# Patient Record
Sex: MALE | Race: WHITE
[De-identification: names, ages, dates, MRNs, and addresses within clinical notes are randomized per-mention and may not be internally consistent; named-entity substitution may affect disease eponyms.]

---

## 2023-04-10 ENCOUNTER — HOSPITAL ENCOUNTER (OUTPATIENT)
Dept: HOSPITAL 75 - ENDO | Age: 53
Discharge: HOME | End: 2023-04-10
Attending: SURGERY
Payer: COMMERCIAL

## 2023-04-10 VITALS — BODY MASS INDEX: 32.52 KG/M2 | WEIGHT: 240.08 LBS | HEIGHT: 72.01 IN

## 2023-04-10 VITALS — SYSTOLIC BLOOD PRESSURE: 101 MMHG | DIASTOLIC BLOOD PRESSURE: 65 MMHG

## 2023-04-10 VITALS — DIASTOLIC BLOOD PRESSURE: 58 MMHG | SYSTOLIC BLOOD PRESSURE: 96 MMHG

## 2023-04-10 VITALS — SYSTOLIC BLOOD PRESSURE: 104 MMHG | DIASTOLIC BLOOD PRESSURE: 63 MMHG

## 2023-04-10 VITALS — DIASTOLIC BLOOD PRESSURE: 77 MMHG | SYSTOLIC BLOOD PRESSURE: 128 MMHG

## 2023-04-10 VITALS — DIASTOLIC BLOOD PRESSURE: 63 MMHG | SYSTOLIC BLOOD PRESSURE: 104 MMHG

## 2023-04-10 DIAGNOSIS — K57.30: ICD-10-CM

## 2023-04-10 DIAGNOSIS — Z12.11: Primary | ICD-10-CM

## 2023-04-10 DIAGNOSIS — K63.5: ICD-10-CM

## 2023-04-10 DIAGNOSIS — Z80.0: ICD-10-CM

## 2023-04-10 DIAGNOSIS — D12.2: ICD-10-CM

## 2023-04-10 DIAGNOSIS — E66.9: ICD-10-CM

## 2023-04-10 DIAGNOSIS — K64.8: ICD-10-CM

## 2023-04-10 NOTE — PROGRESS NOTE-POST OPERATIVE
Post-Operative Progess Note


Surgeon (s)/Assistant (s)


Surgeon


ARACELI GONZALEZ DO


Assistant:  [address], DAYLINII





Pre-Operative Diagnosis


screening colonoscopy





Post-Operative Diagnosis





Polyps


diverticula


int hemorrhoids





Procedure & Operative Findings


Date of Procedure


4/10/23


Procedure Performed/Findings


Colonoscopy with snare polypectomy





PROCEDURE NOTE:


After informed consent was obtained, the patient was brought to the endoscopy


suite, placed in bed in left lateral decubitus position.  He  was administered


IV sedation by the CRNA who then monitored his vitals the entire time, heart


rate, blood pressure and pulse ox and the scope was inserted.  On the way in, in


the Ascending colon found a larger polyp; removed with hot snare in two pieces. 

I


then pushed all the way to about 150 cm and pushed into the cecum, took a 

picture 


of the appendiceal orifice and noted the ileocecal valve. Then slowly withdrew 

the 


scope; tried to suction up the polyp, but it was too large to come through the 

scope.


I then had to use a Valdivia net to grab the polyp and insufflated to look 

circumferentially 


at the walls starting in the cecum, up the ascending colon to the hepatic 

flexure, then 


down the transverse colon, to the splenic flexure and into the descending colon.

Saw


another polyp here, but removed the scope entirely to get the earlier polyp out.

 Then


reinserted the scope up to the Descending colon, found the polyp and removed it


with snare polypectomy.  Next, continues down into the sigmoid and then into the


rectal vault and retroflexed the scope. Took picture of the internal 

hemorrhoids.  I had


also noted and taken pictures of diverticula throughout the left side of the 

colon.





The patient tolerated the procedure.  He was recovered in endoscopy suite.





Recommended for repeat colonoscopy in 1 year, because of the large polyp removed


in pieces.


Anesthesia Type


IV sedation by CRNA





Estimated Blood Loss


Estimated blood loss (mL):  scant





Specimens/Packing


Specimens Removed


asc colon polyp


decs colon polyp











ARACELI GONZALEZ DO               Apr 10, 2023 09:13

## 2023-04-10 NOTE — ANESTHESIA-GENERAL POST-OP
MAC


Patient Condition


Mental Status/LOC:  Same as Preop


Cardiovascular:  Satisfactory


Nausea/Vomiting:  Absent


Respiratory:  Satisfactory


Pain:  Controlled


Complications:  Absent





Post Op Complications


Complications


None





Follow Up Care/Instructions


Patient Instructions


None needed.





Anesthesiology Discharge Order


Discharge Order


Patient is doing well, no complaints, stable vital signs, no apparent adverse 

anesthesia problems.   


No complications reported per nursing.











YAMINI HENDRICKSON CRNA              Apr 10, 2023 13:15

## 2023-04-10 NOTE — ENDOSCOPY DISCHARGE INSTRUCT
Endo Procedure/Findings


Findings


1.:  Polyp


2.:  Diverticulosis


3.:  Internal Hemorrhoids





Discharge Instructions


-


Activity: You might feel a little sleepy until tomorrow.  This is due to the 

medicine you received to relax you.





Until tomorrow, you should:  


   NOT drive a car, operate machinery or power tools.


   NOT drink any alcoholic beverages.


   NOT make any important decisions or sign importortant papers.





Do not return to work until tomorrow, unless otherwise instructed. Resume 

previous activities tomorrow.





Diet: Start by taking liquids.  If you tolerate liquids, advance to solid food.


1.:  Colonoscopy in 1 year





Notify Physician


-


If you experience excessive bleeding, unusual abdominal pain, fever, or chest 

pain, contact your doctor immediately.





Follow-Up:


Other Follow up


in my office in one week











ARACELI GONZALEZ DO               Apr 10, 2023 09:14

## 2023-04-10 NOTE — PROGRESS NOTE-PRE OPERATIVE
Pre-Operative Progress Note


Date of Available H&P:  Mar 28, 2023


Date H&P Reviewed:  Apr 10, 2023


Time H&P Reviewed:  08:21


History & Physical:  H&P Reviewed, Patient Examed, No changes noted


Pre-Operative Diagnosis:  screening colonoscopy











ARACELI GONZALEZ DO               Apr 10, 2023 08:29

## 2023-04-12 ENCOUNTER — HOSPITAL ENCOUNTER (EMERGENCY)
Dept: HOSPITAL 75 - ER | Age: 53
Discharge: HOME | End: 2023-04-12
Payer: COMMERCIAL

## 2023-04-12 VITALS — WEIGHT: 229.28 LBS | BODY MASS INDEX: 27.92 KG/M2 | HEIGHT: 75.98 IN

## 2023-04-12 VITALS — SYSTOLIC BLOOD PRESSURE: 155 MMHG | DIASTOLIC BLOOD PRESSURE: 108 MMHG

## 2023-04-12 DIAGNOSIS — Z28.310: ICD-10-CM

## 2023-04-12 DIAGNOSIS — K92.1: Primary | ICD-10-CM

## 2023-04-12 LAB
BASOPHILS # BLD AUTO: 0 10^3/UL (ref 0–0.1)
BASOPHILS NFR BLD AUTO: 0 % (ref 0–10)
EOSINOPHIL # BLD AUTO: 0.2 10^3/UL (ref 0–0.3)
EOSINOPHIL NFR BLD AUTO: 3 % (ref 0–10)
HCT VFR BLD CALC: 40 % (ref 40–54)
HGB BLD-MCNC: 13.7 G/DL (ref 13.3–17.7)
LYMPHOCYTES # BLD AUTO: 1.9 10^3/UL (ref 1–4)
LYMPHOCYTES NFR BLD AUTO: 28 % (ref 12–44)
MANUAL DIFFERENTIAL PERFORMED BLD QL: NO
MCH RBC QN AUTO: 30 PG (ref 25–34)
MCHC RBC AUTO-ENTMCNC: 34 G/DL (ref 32–36)
MCV RBC AUTO: 88 FL (ref 80–99)
MONOCYTES # BLD AUTO: 0.6 10^3/UL (ref 0–1)
MONOCYTES NFR BLD AUTO: 9 % (ref 0–12)
NEUTROPHILS # BLD AUTO: 4 10^3/UL (ref 1.8–7.8)
NEUTROPHILS NFR BLD AUTO: 59 % (ref 42–75)
PLATELET # BLD: 253 10^3/UL (ref 130–400)
PMV BLD AUTO: 9.9 FL (ref 9–12.2)
WBC # BLD AUTO: 6.7 10^3/UL (ref 4.3–11)

## 2023-04-12 PROCEDURE — 99281 EMR DPT VST MAYX REQ PHY/QHP: CPT

## 2023-04-12 PROCEDURE — 85025 COMPLETE CBC W/AUTO DIFF WBC: CPT

## 2023-04-12 PROCEDURE — 36415 COLL VENOUS BLD VENIPUNCTURE: CPT

## 2023-04-12 NOTE — ED GI
General


Chief Complaint:  Rect Problems


Stated Complaint:  RECTAL BLEEDING


Nursing Triage Note:  


PATIENT STATES MONDAY HE HAD A COLONOSCOPY BY [facility], STATES HAD POLYPS REMOVED 


AND DX DIVERTICULITIS. PATIENT STATES STARTING AT 0430 THIS AM HAS HAD DARK RED 


BLOOD STOOLS.


Source of Information:  Patient


Exam Limitations:  No Limitations





History of Present Illness


Date Seen by Provider:  Apr 12, 2023


Time Seen by Provider:  06:16


Initial Comments


52-year-old male presents to the emergency department today for blood in his 

stools.  He had a colonoscopy on 4/10 and 2 polyps were excised.  He was also 

found to have internal hemorrhoids and diverticulosis.  He states he was doing 

relatively well.  He had a bowel movement this morning that he describes as 

mostly dark blood.  He went to work and then had a similar episode.  He came in 

for further evaluation.  He states he called the [place] emergency department 

and was advised to present here.  Denies any fevers or chills.  He is not on any

blood thinning medications.  No dizziness, lightheadedness.  No abdominal pain.





All other systems reviewed and negative except documented per HPI.





Voice recognition software was used to help create this chart





Allergies and Home Medications


Allergies


Coded Allergies:  


     metoclopramide (Unverified  Allergy, Unknown, 3/29/23)





Patient Home Medication List


Home Medication List Reviewed:  Yes


Albuterol Sulfate (Ventolin Hfa) 1 Puff Puff, 2 PUFF INH Q4H PRN for WHEEZING, 

(Reported)


   Entered as Reported by: KISHOR HURST on 3/29/23 0942


Budesonide/Formoterol Fumarate (Symbicort 80-4.5 Mcg Inhaler) 80 Mcg-4.5 

Mcg/Actuation Hfa.aer.ad, 2 PUFF IH BID, (Reported)


   Entered as Reported by: KISHOR HURST on 3/29/23 0942


Hydrochlorothiazide (Hydrochlorothiazide) 25 Mg Tablet, 25 MG PO DAILY, (

Reported)


   Entered as Reported by: KISHOR HURST on 3/29/23 0942


Metformin HCl (Metformin HCl) 500 Mg Tablet, 500 MG PO BID, (Reported)


   Entered as Reported by: KISHOR HURST on 3/29/23 0942


Simvastatin (Simvastatin) 20 Mg Tablet, 20 MG PO HS, (Reported)


   Entered as Reported by: KISHOR HURST on 3/29/23 0942





Review of Systems


Review of Systems


Constitutional:  see HPI





Past Medical-Social-Family Hx


Patient Social History


Tobacco Use?:  No


Use of E-Cig and/or Vaping dev:  No


Substance use?:  No


Alcohol Use?:  No





Immunizations Up To Date


First/Initial COVID19 Vaccinat:  yes


Second COVID19 Vaccination German:  yes


Third COVID19 Vaccination Date:  yes





Seasonal Allergies


Seasonal Allergies:  No





Past Medical History


Surgeries:  Yes (knee scope, jaw sx, hernia, colon resection)


Appendectomy


Respiratory:  Yes


Asthma


Cardiac:  Yes


High Cholesterol, Hypertension


Neurological:  No


Genitourinary:  No


Gastrointestinal:  Yes


Diverticulosis


Musculoskeletal:  No


Endocrine:  No (borderline )


HEENT:  No


Cancer:  No


Psychosocial:  No


Integumentary:  No


Blood Disorders:  No





Family Medical History


Reviewed Nursing Family Hx


No Pertinent Family Hx





Physical Exam


Vital Signs





Vital Signs - First Documented








 4/12/23





 06:21


 


Temp 36.9


 


Pulse 104


 


Resp 22


 


B/P (MAP) 155/108 (124)


 


Pulse Ox 97


 


O2 Delivery Room Air





Capillary Refill : Less Than 3 Seconds


Height/Weight/BMI


Height: '"


Weight: lbs. oz. kg; 27.00 BMI


Method:


General Appearance:  WD/WN, no apparent distress


HEENT:  normal ENT inspection


Respiratory:  chest non-tender, lungs clear, normal breath sounds


Cardiovascular:  regular rate, rhythm, no murmur


Gastrointestinal:  normal bowel sounds, non tender, soft


Neurologic/Psychiatric:  alert, normal mood/affect, oriented x 3


Skin:  normal color, warm/dry





Progress/Results/Core Measures


Results/Orders


Lab Results





Laboratory Tests








Test


 4/12/23


06:36 Range/Units


 


 


White Blood Count


 6.7 


 4.3-11.0


10^3/uL


 


Red Blood Count


 4.61 


 4.30-5.52


10^6/uL


 


Hemoglobin 13.7  13.3-17.7  g/dL


 


Hematocrit 40  40-54  %


 


Mean Corpuscular Volume 88  80-99  fL


 


Mean Corpuscular Hemoglobin 30  25-34  pg


 


Mean Corpuscular Hemoglobin


Concent 34 


 32-36  g/dL





 


Red Cell Distribution Width 13.4  10.0-14.5  %


 


Platelet Count


 253 


 130-400


10^3/uL


 


Mean Platelet Volume 9.9  9.0-12.2  fL


 


Immature Granulocyte % (Auto) 1   %


 


Neutrophils (%) (Auto) 59  42-75  %


 


Lymphocytes (%) (Auto) 28  12-44  %


 


Monocytes (%) (Auto) 9  0-12  %


 


Eosinophils (%) (Auto) 3  0-10  %


 


Basophils (%) (Auto) 0  0-10  %


 


Neutrophils # (Auto)


 4.0 


 1.8-7.8


10^3/uL


 


Lymphocytes # (Auto)


 1.9 


 1.0-4.0


10^3/uL


 


Monocytes # (Auto)


 0.6 


 0.0-1.0


10^3/uL


 


Eosinophils # (Auto)


 0.2 


 0.0-0.3


10^3/uL


 


Basophils # (Auto)


 0.0 


 0.0-0.1


10^3/uL


 


Immature Granulocyte # (Auto)


 0.0 


 0.0-0.1


10^3/uL








My Orders





Orders - TARSHA CHEN DO


Cbc With Automated Diff (4/12/23 06:28)





Vital Signs/I&O











 4/12/23





 06:21


 


Temp 36.9


 


Pulse 104


 


Resp 22


 


B/P (MAP) 155/108 (124)


 


Pulse Ox 97


 


O2 Delivery Room Air














Blood Pressure Mean:                    124











Departure


Communication (Admissions)


Patient is hemodynamically stable outside of some very mild borderline tac

hycardia.  He does appear quite anxious which may account for this.  His blood 

pressures are normal.  He has had no further bleeding here and is not on any 

blood thinning medications.  Hemoglobin is 13.7.  Unfortunately I do not have a 

comparison.  He has no abdominal pain.  I spoke to Dr. Gonzalez.  He request 

discharge home and he will see the patient in the clinic on Thursday.  He did 

give patient strict return precautions for increased work persistent bleeding, 

dizziness lightheadedness or shortness of breath.  He states understanding.  He 

is discharged in stable condition.





Impression





   Primary Impression:  


   Bloody stools


Disposition:  01 HOME, SELF-CARE


Condition:  Stable





Departure-Patient Inst.


Referrals:  


[facility] (PCP)


Primary Care Physician








ARACELI GONZALEZ DO


Patient Instructions:  Bloody Stools, Adult (DC)





Add. Discharge Instructions:  


Your blood counts are stable.  We had no further bleeding here today.  I spoke 

with Dr. Gonzalez, he requests you see him in the office on Thursday.  Return to 

the emergency department if you continue to have significant amounts of blood in

your stool, if you develop dizziness, lightheadedness or shortness of breath.





All discharge instructions reviewed with patient and/or family. Voiced 

understanding.











TARSHA CHEN DO            Apr 12, 2023 06:31